# Patient Record
Sex: FEMALE | Race: WHITE | NOT HISPANIC OR LATINO | Employment: STUDENT | URBAN - NONMETROPOLITAN AREA
[De-identification: names, ages, dates, MRNs, and addresses within clinical notes are randomized per-mention and may not be internally consistent; named-entity substitution may affect disease eponyms.]

---

## 2021-04-23 ENCOUNTER — OFFICE VISIT (OUTPATIENT)
Dept: OBSTETRICS AND GYNECOLOGY | Facility: CLINIC | Age: 21
End: 2021-04-23

## 2021-04-23 VITALS
WEIGHT: 293 LBS | BODY MASS INDEX: 53.92 KG/M2 | HEIGHT: 62 IN | DIASTOLIC BLOOD PRESSURE: 74 MMHG | SYSTOLIC BLOOD PRESSURE: 110 MMHG

## 2021-04-23 DIAGNOSIS — N92.0 MENORRHAGIA WITH REGULAR CYCLE: Primary | ICD-10-CM

## 2021-04-23 DIAGNOSIS — N94.6 DYSMENORRHEA: ICD-10-CM

## 2021-04-23 DIAGNOSIS — Z30.011 ENCOUNTER FOR INITIAL PRESCRIPTION OF CONTRACEPTIVE PILLS: ICD-10-CM

## 2021-04-23 PROCEDURE — 99203 OFFICE O/P NEW LOW 30 MIN: CPT | Performed by: MIDWIFE

## 2021-04-23 RX ORDER — SERTRALINE HYDROCHLORIDE 100 MG/1
100 TABLET, FILM COATED ORAL DAILY
COMMUNITY

## 2021-04-23 RX ORDER — ARIPIPRAZOLE 5 MG/1
5 TABLET ORAL DAILY
COMMUNITY
Start: 2021-04-15

## 2021-04-23 RX ORDER — LEVONORGESTREL AND ETHINYL ESTRADIOL 0.1-0.02MG
1 KIT ORAL DAILY
Qty: 28 TABLET | Refills: 12 | Status: SHIPPED | OUTPATIENT
Start: 2021-04-23 | End: 2022-04-23

## 2021-04-23 NOTE — PROGRESS NOTES
"Subjective   Chief Complaint   Patient presents with   • Menorrhagia     (  Patient is a Virgin )pt states her menses are very heavy and painful, has been going on since she started menses @ age 9      Danuta Gutierrez is a 21 y.o. year old  presenting to be seen for heavy painful periods.  Menarche at age 9.  Periods have been monthly and the last for 5 days.  She states they are heavy the first 1 to 3 days with moderate to severe cramping.  She takes ibuprofen for pain.  She has never been sexually active.  She would like to try birth control pills to help regulate her menses.    History  Past Medical History:   Diagnosis Date   • Anemia    • Anxiety    • Depression    , Allergies:  Patient has no known allergies.        Review of Systems  Pertinent items are noted in HPI, all other systems reviewed and negative       Objective   /74   Ht 157.5 cm (62\")   Wt (!) 141 kg (311 lb)   LMP 03/15/2021 (Approximate)   BMI 56.88 kg/m²     Physical Exam:  General Appearance: alert, appears stated age and cooperative  Lungs: respirations regular, respirations even and respirations unlabored  Extremities: moves extremities well, no edema, no cyanosis and no redness  Skin: no bleeding, bruising or rash and no lesions noted  Neurologic: Mental Status orientated to person, place, time and situation, Speech normal content and execusion    Lab Review   No data reviewed    Imaging   No data reviewed         Assessment /Plan    Diagnoses and all orders for this visit:    1. Menorrhagia with regular cycle (Primary)    2. Encounter for initial prescription of contraceptive pills  She was instructed how to start her birth control.  It should be started on  following the onset of her next cycle.  The patient was educated regarding the correct and consistent use.  Because it may take 1 month to become effective, the use of alternative contraception for one month was stressed.  The potential for breakthrough bleeding " for up to 4 cycles was also emphasized.  In addition, the patient was counseled regarding hormonal contraception not providing protecting against sexually transmitted infections and the need for safe sex practices.  3. Dysmenorrhea    Other orders  -     levonorgestrel-ethinyl estradiol (AVIANE,ALESSE,LESSINA) 0.1-20 MG-MCG per tablet; Take 1 tablet by mouth Daily.  Dispense: 28 tablet; Refill: 12        New Medications Ordered This Visit   Medications   • levonorgestrel-ethinyl estradiol (AVIANE,ALESSE,LESSINA) 0.1-20 MG-MCG per tablet     Sig: Take 1 tablet by mouth Daily.     Dispense:  28 tablet     Refill:  12     Follow up 4 weeks            This note was electronically signed.  Shruti Noe CNM  4/23/2021

## 2021-05-21 ENCOUNTER — OFFICE VISIT (OUTPATIENT)
Dept: OBSTETRICS AND GYNECOLOGY | Facility: CLINIC | Age: 21
End: 2021-05-21

## 2021-05-21 VITALS
SYSTOLIC BLOOD PRESSURE: 118 MMHG | BODY MASS INDEX: 53.92 KG/M2 | DIASTOLIC BLOOD PRESSURE: 76 MMHG | WEIGHT: 293 LBS | HEIGHT: 62 IN

## 2021-05-21 DIAGNOSIS — Z30.41 ENCOUNTER FOR SURVEILLANCE OF CONTRACEPTIVE PILLS: Primary | ICD-10-CM

## 2021-05-21 PROCEDURE — 99212 OFFICE O/P EST SF 10 MIN: CPT | Performed by: MIDWIFE

## 2021-05-21 NOTE — PROGRESS NOTES
"Subjective  Chief Complaint   Patient presents with   • Follow-up     BC follow up, c/o moodiness.     Danuta Gutierrez is a 21 y.o. year old  here for F/U on new start OCs.  She is near the end of her first pack of pills. She has had a little bit of spotting.  She has noticed an increase in moodiness and some depression.  She is on Zoloft and Abilify.    History  Past Medical History:   Diagnosis Date   • Anemia    • Anxiety    • Depression      Current Outpatient Medications on File Prior to Visit   Medication Sig Dispense Refill   • ARIPiprazole (ABILIFY) 5 MG tablet Take 5 mg by mouth Daily.     • levonorgestrel-ethinyl estradiol (AVIANE,ALESSE,LESSINA) 0.1-20 MG-MCG per tablet Take 1 tablet by mouth Daily. 28 tablet 12   • sertraline (ZOLOFT) 100 MG tablet Take 100 mg by mouth Daily.     • sertraline (ZOLOFT) 50 MG tablet Take 50 mg by mouth Daily.       No current facility-administered medications on file prior to visit.       Review of Systems  Pertinent items are noted in HPI, all other systems reviewed and negative    Objective  Vitals:    21 1434   BP: 118/76   Weight: (!) 141 kg (310 lb)   Height: 157.5 cm (62\")     Physical Exam:  General Appearance: alert, appears stated age and cooperative  Lungs: respirations regular, respirations even and respirations unlabored  Extremities: moves extremities well, no edema, no cyanosis and no redness  Skin: no bleeding, bruising or rash and no lesions noted  Neurologic: Mental Status orientated to person, place, time and situation, Speech normal content and execusion    Lab Review   No data reviewed    Imaging   No data reviewed    Assessment/Plan    Problem List Items Addressed This Visit     Encounter for surveillance of birth control pills        No orders of the defined types were placed in this encounter.  Advised to continue OCs and see how she feels during 4th pack. Also advised to see if moodiness is more prevalent any certain week of OCs  Follow up " 1 year for annual exam    This note was electronically signed.  Shruti Noe, APRN  May 21, 2021

## 2023-08-30 ENCOUNTER — APPOINTMENT (OUTPATIENT)
Dept: CT IMAGING | Facility: HOSPITAL | Age: 23
End: 2023-08-30
Payer: COMMERCIAL

## 2023-08-30 ENCOUNTER — APPOINTMENT (OUTPATIENT)
Dept: MRI IMAGING | Facility: HOSPITAL | Age: 23
End: 2023-08-30
Payer: COMMERCIAL

## 2023-08-30 ENCOUNTER — APPOINTMENT (OUTPATIENT)
Dept: SLEEP MEDICINE | Facility: HOSPITAL | Age: 23
End: 2023-08-30
Payer: COMMERCIAL

## 2023-08-30 ENCOUNTER — HOSPITAL ENCOUNTER (EMERGENCY)
Facility: HOSPITAL | Age: 23
Discharge: HOME | End: 2023-08-30
Attending: EMERGENCY MEDICINE
Payer: COMMERCIAL

## 2023-08-30 VITALS
DIASTOLIC BLOOD PRESSURE: 65 MMHG | HEART RATE: 72 BPM | BODY MASS INDEX: 53.92 KG/M2 | HEIGHT: 62 IN | OXYGEN SATURATION: 96 % | WEIGHT: 293 LBS | RESPIRATION RATE: 18 BRPM | SYSTOLIC BLOOD PRESSURE: 102 MMHG | TEMPERATURE: 98.7 F

## 2023-08-30 DIAGNOSIS — R56.9 SEIZURE-LIKE ACTIVITY: Primary | ICD-10-CM

## 2023-08-30 LAB
ALBUMIN SERPL-MCNC: 3.9 G/DL (ref 3.5–5.2)
ALBUMIN/GLOB SERPL: 1.4 G/DL
ALP SERPL-CCNC: 114 U/L (ref 39–117)
ALT SERPL W P-5'-P-CCNC: 27 U/L (ref 1–33)
AMPHET+METHAMPHET UR QL: NEGATIVE
AMPHETAMINES UR QL: NEGATIVE
ANION GAP SERPL CALCULATED.3IONS-SCNC: 11.5 MMOL/L (ref 5–15)
AST SERPL-CCNC: 21 U/L (ref 1–32)
B-HCG UR QL: NEGATIVE
BARBITURATES UR QL SCN: NEGATIVE
BASOPHILS # BLD AUTO: 0.03 10*3/MM3 (ref 0–0.2)
BASOPHILS NFR BLD AUTO: 0.3 % (ref 0–1.5)
BENZODIAZ UR QL SCN: NEGATIVE
BILIRUB SERPL-MCNC: 0.2 MG/DL (ref 0–1.2)
BUN SERPL-MCNC: 8 MG/DL (ref 6–20)
BUN/CREAT SERPL: 11.6 (ref 7–25)
BUPRENORPHINE SERPL-MCNC: NEGATIVE NG/ML
CALCIUM SPEC-SCNC: 8.8 MG/DL (ref 8.6–10.5)
CANNABINOIDS SERPL QL: NEGATIVE
CHLORIDE SERPL-SCNC: 103 MMOL/L (ref 98–107)
CO2 SERPL-SCNC: 24.5 MMOL/L (ref 22–29)
COCAINE UR QL: NEGATIVE
CREAT SERPL-MCNC: 0.69 MG/DL (ref 0.57–1)
DEPRECATED RDW RBC AUTO: 39.1 FL (ref 37–54)
EGFRCR SERPLBLD CKD-EPI 2021: 124.5 ML/MIN/1.73
EOSINOPHIL # BLD AUTO: 0.1 10*3/MM3 (ref 0–0.4)
EOSINOPHIL NFR BLD AUTO: 1.1 % (ref 0.3–6.2)
ERYTHROCYTE [DISTWIDTH] IN BLOOD BY AUTOMATED COUNT: 13.3 % (ref 12.3–15.4)
GLOBULIN UR ELPH-MCNC: 2.7 GM/DL
GLUCOSE SERPL-MCNC: 88 MG/DL (ref 65–99)
HCT VFR BLD AUTO: 38.7 % (ref 34–46.6)
HGB BLD-MCNC: 12.6 G/DL (ref 12–15.9)
HOLD SPECIMEN: NORMAL
HOLD SPECIMEN: NORMAL
IMM GRANULOCYTES # BLD AUTO: 0.06 10*3/MM3 (ref 0–0.05)
IMM GRANULOCYTES NFR BLD AUTO: 0.6 % (ref 0–0.5)
LYMPHOCYTES # BLD AUTO: 1.95 10*3/MM3 (ref 0.7–3.1)
LYMPHOCYTES NFR BLD AUTO: 21.1 % (ref 19.6–45.3)
MAGNESIUM SERPL-MCNC: 2.1 MG/DL (ref 1.6–2.6)
MCH RBC QN AUTO: 26.3 PG (ref 26.6–33)
MCHC RBC AUTO-ENTMCNC: 32.6 G/DL (ref 31.5–35.7)
MCV RBC AUTO: 80.6 FL (ref 79–97)
METHADONE UR QL SCN: NEGATIVE
MONOCYTES # BLD AUTO: 0.73 10*3/MM3 (ref 0.1–0.9)
MONOCYTES NFR BLD AUTO: 7.9 % (ref 5–12)
NEUTROPHILS NFR BLD AUTO: 6.39 10*3/MM3 (ref 1.7–7)
NEUTROPHILS NFR BLD AUTO: 69 % (ref 42.7–76)
NRBC BLD AUTO-RTO: 0 /100 WBC (ref 0–0.2)
OPIATES UR QL: NEGATIVE
OXYCODONE UR QL SCN: NEGATIVE
PCP UR QL SCN: NEGATIVE
PHOSPHATE SERPL-MCNC: 2.1 MG/DL (ref 2.5–4.5)
PLATELET # BLD AUTO: 330 10*3/MM3 (ref 140–450)
PMV BLD AUTO: 9.2 FL (ref 6–12)
POTASSIUM SERPL-SCNC: 3.4 MMOL/L (ref 3.5–5.2)
PROPOXYPH UR QL: NEGATIVE
PROT SERPL-MCNC: 6.6 G/DL (ref 6–8.5)
RBC # BLD AUTO: 4.8 10*6/MM3 (ref 3.77–5.28)
SODIUM SERPL-SCNC: 139 MMOL/L (ref 136–145)
TRICYCLICS UR QL SCN: NEGATIVE
WBC NRBC COR # BLD: 9.26 10*3/MM3 (ref 3.4–10.8)
WHOLE BLOOD HOLD COAG: NORMAL
WHOLE BLOOD HOLD SPECIMEN: NORMAL

## 2023-08-30 PROCEDURE — 83735 ASSAY OF MAGNESIUM: CPT | Performed by: EMERGENCY MEDICINE

## 2023-08-30 PROCEDURE — 80053 COMPREHEN METABOLIC PANEL: CPT | Performed by: EMERGENCY MEDICINE

## 2023-08-30 PROCEDURE — 85025 COMPLETE CBC W/AUTO DIFF WBC: CPT | Performed by: EMERGENCY MEDICINE

## 2023-08-30 PROCEDURE — 93005 ELECTROCARDIOGRAM TRACING: CPT

## 2023-08-30 PROCEDURE — 95816 EEG AWAKE AND DROWSY: CPT

## 2023-08-30 PROCEDURE — 95816 EEG AWAKE AND DROWSY: CPT | Performed by: PSYCHIATRY & NEUROLOGY

## 2023-08-30 PROCEDURE — 93005 ELECTROCARDIOGRAM TRACING: CPT | Performed by: EMERGENCY MEDICINE

## 2023-08-30 PROCEDURE — 25010000002 LORAZEPAM PER 2 MG: Performed by: EMERGENCY MEDICINE

## 2023-08-30 PROCEDURE — 25510000001 IOPAMIDOL 61 % SOLUTION: Performed by: EMERGENCY MEDICINE

## 2023-08-30 PROCEDURE — 99285 EMERGENCY DEPT VISIT HI MDM: CPT

## 2023-08-30 PROCEDURE — 82948 REAGENT STRIP/BLOOD GLUCOSE: CPT

## 2023-08-30 PROCEDURE — 70496 CT ANGIOGRAPHY HEAD: CPT

## 2023-08-30 PROCEDURE — 0 GADOBENATE DIMEGLUMINE 529 MG/ML SOLUTION: Performed by: EMERGENCY MEDICINE

## 2023-08-30 PROCEDURE — 81025 URINE PREGNANCY TEST: CPT | Performed by: EMERGENCY MEDICINE

## 2023-08-30 PROCEDURE — 80306 DRUG TEST PRSMV INSTRMNT: CPT | Performed by: EMERGENCY MEDICINE

## 2023-08-30 PROCEDURE — A9577 INJ MULTIHANCE: HCPCS | Performed by: EMERGENCY MEDICINE

## 2023-08-30 PROCEDURE — 70450 CT HEAD/BRAIN W/O DYE: CPT

## 2023-08-30 PROCEDURE — 70553 MRI BRAIN STEM W/O & W/DYE: CPT

## 2023-08-30 PROCEDURE — 99204 OFFICE O/P NEW MOD 45 MIN: CPT | Performed by: STUDENT IN AN ORGANIZED HEALTH CARE EDUCATION/TRAINING PROGRAM

## 2023-08-30 PROCEDURE — 96374 THER/PROPH/DIAG INJ IV PUSH: CPT

## 2023-08-30 PROCEDURE — 84100 ASSAY OF PHOSPHORUS: CPT | Performed by: EMERGENCY MEDICINE

## 2023-08-30 RX ORDER — DIPHENHYDRAMINE HYDROCHLORIDE 50 MG/ML
25 INJECTION INTRAMUSCULAR; INTRAVENOUS ONCE
Status: DISCONTINUED | OUTPATIENT
Start: 2023-08-30 | End: 2023-08-30 | Stop reason: HOSPADM

## 2023-08-30 RX ORDER — LORAZEPAM 2 MG/ML
1 INJECTION INTRAMUSCULAR ONCE
Status: COMPLETED | OUTPATIENT
Start: 2023-08-30 | End: 2023-08-30

## 2023-08-30 RX ORDER — TOPIRAMATE 25 MG/1
25 TABLET ORAL ONCE
Status: COMPLETED | OUTPATIENT
Start: 2023-08-30 | End: 2023-08-30

## 2023-08-30 RX ORDER — PROCHLORPERAZINE EDISYLATE 5 MG/ML
10 INJECTION INTRAMUSCULAR; INTRAVENOUS ONCE
Status: DISCONTINUED | OUTPATIENT
Start: 2023-08-30 | End: 2023-08-30 | Stop reason: HOSPADM

## 2023-08-30 RX ORDER — TOPIRAMATE 25 MG/1
25 TABLET ORAL 2 TIMES DAILY
Qty: 120 TABLET | Refills: 0 | Status: SHIPPED | OUTPATIENT
Start: 2023-08-30

## 2023-08-30 RX ORDER — ACETAMINOPHEN 500 MG
1000 TABLET ORAL ONCE
Status: COMPLETED | OUTPATIENT
Start: 2023-08-30 | End: 2023-08-30

## 2023-08-30 RX ORDER — SODIUM CHLORIDE 0.9 % (FLUSH) 0.9 %
10 SYRINGE (ML) INJECTION AS NEEDED
Status: DISCONTINUED | OUTPATIENT
Start: 2023-08-30 | End: 2023-08-30 | Stop reason: HOSPADM

## 2023-08-30 RX ADMIN — DIBASIC SODIUM PHOSPHATE, MONOBASIC POTASSIUM PHOSPHATE AND MONOBASIC SODIUM PHOSPHATE 2 TABLET: 852; 155; 130 TABLET ORAL at 13:14

## 2023-08-30 RX ADMIN — LORAZEPAM 1 MG: 2 INJECTION INTRAMUSCULAR; INTRAVENOUS at 07:39

## 2023-08-30 RX ADMIN — IOPAMIDOL 100 ML: 612 INJECTION, SOLUTION INTRAVENOUS at 16:03

## 2023-08-30 RX ADMIN — GADOBENATE DIMEGLUMINE 29 ML: 529 INJECTION, SOLUTION INTRAVENOUS at 13:07

## 2023-08-30 RX ADMIN — TOPIRAMATE 25 MG: 25 TABLET, FILM COATED ORAL at 19:15

## 2023-08-30 RX ADMIN — SODIUM CHLORIDE 1000 ML: 9 INJECTION, SOLUTION INTRAVENOUS at 07:39

## 2023-08-30 RX ADMIN — ACETAMINOPHEN 1000 MG: 500 TABLET, FILM COATED ORAL at 11:01

## 2023-08-30 NOTE — ED PROVIDER NOTES
HPI: Danuta Gutierrez is a 23 y.o. female who presents to the emergency department complaining of seizure.  Patient states she does not really remember what happened this morning.  Her parents state they heard her talking in her sleep which was not really unusual for her but then something sounded different so they went to check on her.  They found her convulsing.  This lasted maybe 5 to 10 minutes.  It took her then nearly an hour to get back to normal.  At this time she feels weak.  She also notes some numbness to her upper lip.  She denies any fevers, chills, nausea, vomiting, chest pain, shortness of breath.  She did bite her tongue during the episode.  No personal or family history of seizure.      REVIEW OF SYSTEMS: All other systems reviewed and are negative     PAST MEDICAL HISTORY:   Past Medical History:   Diagnosis Date    Anemia     Anxiety     Depression         FAMILY HISTORY:   Family History   Problem Relation Age of Onset    Hyperlipidemia Father     Diabetes Paternal Grandfather     Breast cancer Paternal Grandmother     Diabetes Paternal Grandmother         SOCIAL HISTORY:   Social History     Socioeconomic History    Marital status: Single    Number of children: 0   Tobacco Use    Smoking status: Never    Smokeless tobacco: Never   Vaping Use    Vaping Use: Never used   Substance and Sexual Activity    Alcohol use: Yes     Comment: Once a month     Drug use: Never    Sexual activity: Never     Partners: Male     Birth control/protection: None        SURGICAL HISTORY:   Past Surgical History:   Procedure Laterality Date    NO PAST SURGERIES          ALLERGIES: Patient has no known allergies.       PHYSICAL EXAM:   VITAL SIGNS:   Vitals:    08/30/23 1900   BP: 102/65   Pulse: 72   Resp: 18   Temp:    SpO2: 96%      CONSTITUTIONAL: Awake, well appearing, nontoxic   HENT: Atraumatic, normocephalic, oral mucosa moist, airway patent.  Small bite wound to the tip of the tongue nares patent without  drainage. External ears normal.   EYES: Conjunctivae clear, EOMI, PERRL   NECK: Trachea midline, nontender, supple   CARDIOVASCULAR: Mild tachycardia, Normal rhythm.  PULMONARY/CHEST: Normal work of breathing. Clear to auscultation, no rhonchi, wheezes, or rales.  ABDOMINAL: Nondistended, soft, nontender, no rebound or guarding.  NEUROLOGIC: Nonfocal, moves all four extremities, no gross sensory or motor deficits.   EXTREMITIES: No clubbing, cyanosis, or edema   SKIN: Warm, Dry, No erythema, No rash       ED COURSE / MEDICAL DECISION MAKING:     Danuta Gutierrez is a 23 y.o. female who presents to the emergency department for evaluation of seizure.  Well-developed, well-nourished obese young lady in no distress with exam as above.  Her vital signs are normal save for a mild tachycardia.  Her exam is otherwise relatively nonfocal.  Will obtain labs, head CT and give symptomatic treatment.  Disposition pending.    Differential diagnosis includes seizure, electrolyte disturbance, anxiety, malignancy among other etiologies.    EKG interpreted by me: Sinus tachycardia, no acute ST/T changes, this is an abnormal EKG    1120  Labs are nonactionable.  CT of the head is negative.  Patient is resting comfortably.  I discussed the case with Dr. Godinez, he recommended obtaining additional labs, MRI, EEG.  He will then see patient here in the ED.  Disposition pending.    1450  MRI is negative.  EEG has not been performed yet.  Patient has been evaluated by Dr. Godinez, he recommended treatment of headache, CT venogram.  Disposition pending.    1905  Discussed again with Dr. Godinez, he unfortunately cannot get access to the EEG to read it.  CTV is negative.  He recommended discharging patient home with Topamax.  Patient has remained stable throughout her extended stay here in the ED without seizure-like activity.  Patient and family are happy with this plan.    Final diagnoses:   Seizure-like activity        Seun Galarza  MD [place]  08/30/23 7111

## 2023-08-30 NOTE — Clinical Note
[facility] EMERGENCY DEPARTMENT  [address] [place] [address]  Phone: [phone]    Danuta Gutierrez was seen and treated in our emergency department on 8/30/2023.  She may return to work on 09/02/2023.         Thank you for choosing [facility].    Seun Galarza MD

## 2023-08-30 NOTE — CONSULTS
[facility]   Teleneurology Note    Patient Name: Danuta Gutierrez  : 2000  MRN: [id number]  Primary Care Physician: Khadijah Zhou MD  Referring Site: [place]  Location of Neurologist: [facility]    Subjective   Teleneurology Initial Data     Arrival Date Telestroke Site: 23 Arrival Time Telestroke Site: 0651   Neurologist Evaluation Date: 23 Neurologist Evaluation Time: 1442   Date Last Known Well: 23 Time Last Known Well: 043     History     Chief Complaint: seizures  HPI: 23 year female with no medical history, who is morbidly obese, has a history of anxiety who was found to be morning while asleep at around 4:30 AM today.  Family noticed that patient was having generalized tonic-clonic movements, and had a tongue bite laterally.  She was confused post ictal confused for 45 minutes.  She currently experiences headache holocranial.  She has a history of migraines, on anxiety on the left, on birth control pills.   Denies smoking denies any other medical chronic conditions.       Stroke Risk Factors/ Pertinent Data     Stroke risk factors: none  Anticoagulants prior to arrival: not applicable  Antiplatelets prior to arrival: not applicable  Statins prior to arrival: not applicable     Scoring Scales     Pre-Stroke Modified Randolph Scale: 0 - No Symptoms at all.[place] Coma Scale  Best Eye Response: Spontaneous  Best Verbal Response: Oriented  Best Motor Response: Follows commands  [place] Coma Scale Score: 15Intracerebral Hemmorhage (ICH) Score  Zhane Coma Score: 13-15  Age>=80: no     NIH Stroke Scale     NIHSS Performed Date: 23 NIHSS Performed Time: 1443   Interval: baseline  1a. Level of Consciousness: 0-->Alert, keenly responsive  1b. LOC Questions: 0-->Answers both questions correctly  1c. LOC Commands: 0-->Performs both tasks correctly  2. Best Gaze: 0-->Normal  3. Visual: 0-->No visual loss  4. Facial Palsy: 0-->Normal symmetrical movements  5a. Motor Arm, Left: 0-->No  drift, limb holds 90 (or 45) degrees for full 10 secs  5b. Motor Arm, Right: 0-->No drift, limb holds 90 (or 45) degrees for full 10 secs  6a. Motor Leg, Left: 0-->No drift, leg holds 30 degree position for full 5 secs  6b. Motor Leg, Right: 0-->No drift, leg holds 30 degree position for full 5 secs  7. Limb Ataxia: 0-->Absent  8. Sensory: 0-->Normal, no sensory loss  9. Best Language: 0-->No aphasia, normal  10. Dysarthria: 0-->Normal  11. Extinction and Inattention (formerly Neglect): 0-->No abnormality  Total (NIH Stroke Scale): 0     Review of Systems     Review of Systems  Constitutional: No fatigue  HENT: Negative for nosebleeds and rhinorrhea.    Eyes: Negative for redness.   Respiratory: Negative for cough.    Gastrointestinal: Negative for anal bleeding.   Endocrine: Negative for polydipsia.   Genitourinary: Negative for enuresis and urgency.   Musculoskeletal: Negative for joint swelling.   Neurological: Negative for tremors.   Psychiatric/Behavioral: Negative for hallucinations.    Objective   Exam     Exam performed with the help of support staff from the referring site  Neurological Exam  NEURO( Exam is performed with help of hospital staff at bed side and observed by me via audio-video interface)    MENTAL STATUS: AAOx3, memory intact, fund of knowledge appropriate    LANG/SPEECH: Naming and repetition intact, fluent, follows 3-step commands    CRANIAL NERVES:    Pupils equal and reactive, EOMI intact, no gaze deviation, no nystagmus  No facial droop, cough and gag intact, shoulder shrug intact, tongue midline     MOTOR:  Moves all extremities equally    SENSORY: Normal to light touch all throughout     COORDINATION: Normal finger to nose and heel to shin, no tremor, no dysmetria    STATION: Not assessed due to patient condition    GAIT: Not assessed due to patient condition   Result Review    Results          Personal review of CNS imaging:(Official report by radiologist pending)  Imaging  CT Imaging  Review: CT Imaging reviewed, NO acute infarct/ hemorrhage seen  CTA Imaging Review: CTA Imaging reviewed, NO large vessel occlusion or severe stenosis seen  CT Perfusion Review: CT perfusion reviewed, NORMAL  ASPECTS Involved Locations: none  ASPECTS Score: 10    Thrombolytic   Thrombolytics: thrombolytic not given     Assessment & Plan   Assessment/ Plan     Assessment:  This 23-year-old female who had likely a new onset of seizure.  Etiology unknown.  Need to rule out on the provoking factors with her underlying hypoxia and underlying undiagnosed sleep apnea complexed with anxiety.  I will not start any antiseizure medications at this time.  MRI brain with and without contrast showed no acute abnormality.  Recommend CTV to rule out any venous thrombosis, especially with her age with being morbidly obese and on birth control pills.  Symptomatic treatment of her headache.  Recommend Topamax 25 mg twice daily for 1 week, if she tolerates without any side effects escalate 50 mg twice daily for another week, 74 twice daily for another week, stabilize the maintenance dose of 100 mg twice daily.  Patient was counseled regarding the risk of birth defects due to Topamax use if at all if she were to become pregnant.  I am okay for her to be discharged if her EEG is negative, if head CTV is negative.       Please call neurology for any further questions or clarifications    Disposition     Disposition: The patient will remain at the referring institution for further evaluation and management    Medical Decision Making  Medical Data Reviewed: Data reviewed including: clinical labs, radiology and/or medical tests, Independent review of CNS images    Claudio RODRIGUEZ MD, saw the patient on 08/30/23 at 1442 for an initial in-patient or emergency room telememedicine face to face consult using interactive technology for  . The location of the patient was [place]. I was located at [place].    I have proceeded with this  evaluation at the request of the referring practitioner as it is felt to be an emergency setting and no appropriate specialist is available to perform this evaluation. The [facility] has reported that this is the correct patient and has obtained consent from the patient/surrogate to perform this telemedicine evaluation(including obtaining history, performing examination and reviewing data provided by the patient an/or originating site of care provider)    I have introduced myself to the patient, provided my credentials, disclosed my location, and determined that, based on review of the patient's chart and discussion with the patient's primary team, telemedicine via a HIPAA compliant, real-time, face-to-face two-way, interactive audio and video platform is an appropriate and effective means of providing the service.    The patient/surrogate has a right to refuse this evaluation as they have been explained risks including potential loss of confidentiality, benefits, alternatives, and the potential need for subsequent face-to-face care. In this evaluation, we will be providing recommendations only.  The ultimate decision to follow or not to follow these recommendations will be left to the bedside treating/requesting practitioner.    The patient/surrogate has been notified that other healthcare professionals including technical person may be involved in this A/V evaluation.  All laws concerning confidentiality and patient access to medical records and copies of medical records apply to telemedicine.  The patient/surrogate has received the originating [facility]'s Health Notice of Privacy Practices.    Claudio Godinez MD

## 2023-08-31 ENCOUNTER — TELEPHONE (OUTPATIENT)
Dept: NEUROLOGY | Facility: CLINIC | Age: 23
End: 2023-08-31

## 2023-08-31 ENCOUNTER — TELEPHONE (OUTPATIENT)
Dept: NEUROLOGY | Facility: CLINIC | Age: 23
End: 2023-08-31
Payer: COMMERCIAL

## 2023-08-31 NOTE — TELEPHONE ENCOUNTER
Caller: NASIM     Rashid call back number: [phone]    New or established patient?  [x] New  [] Established    Date of discharge: 08.30.23    Facility discharged from: [facility]     Diagnosis/Symptoms: SEIZURE     Length of stay (If applicable): 12 HOURS     Specialty Only: Did you see a [facility] provider?    [x] Yes  [] No  If so, who? CONSULT WITH YOHANNES DILLARD NEEDS F.U WITH NEURO . WILL TAKE FIRST AVAIL.     PLEASE ADVISE.

## 2023-08-31 NOTE — TELEPHONE ENCOUNTER
LVM.  Our office does not see patients for seizures and that is what she was in the ED for.  Patient should be seen by General Neurology.

## 2023-09-07 LAB — GLUCOSE BLDC GLUCOMTR-MCNC: 92 MG/DL (ref 70–130)

## 2023-10-02 ENCOUNTER — OFFICE VISIT (OUTPATIENT)
Dept: NEUROLOGY | Facility: CLINIC | Age: 23
End: 2023-10-02
Payer: COMMERCIAL

## 2023-10-02 VITALS
HEIGHT: 62 IN | DIASTOLIC BLOOD PRESSURE: 70 MMHG | TEMPERATURE: 97.8 F | HEART RATE: 75 BPM | WEIGHT: 293 LBS | OXYGEN SATURATION: 99 % | BODY MASS INDEX: 53.92 KG/M2 | SYSTOLIC BLOOD PRESSURE: 110 MMHG

## 2023-10-02 DIAGNOSIS — R06.83 SNORING: ICD-10-CM

## 2023-10-02 DIAGNOSIS — R56.9 SEIZURE-LIKE ACTIVITY: Primary | ICD-10-CM

## 2023-10-02 DIAGNOSIS — G47.50 PARASOMNIA, UNSPECIFIED TYPE: ICD-10-CM

## 2023-10-02 DIAGNOSIS — G47.19 EXCESSIVE DAYTIME SLEEPINESS: ICD-10-CM

## 2023-10-02 DIAGNOSIS — G47.33 OSA (OBSTRUCTIVE SLEEP APNEA): ICD-10-CM

## 2023-10-02 PROCEDURE — 99214 OFFICE O/P EST MOD 30 MIN: CPT | Performed by: NURSE PRACTITIONER

## 2023-10-02 RX ORDER — TOPIRAMATE 100 MG/1
100 TABLET, FILM COATED ORAL 2 TIMES DAILY
Qty: 60 TABLET | Refills: 5 | Status: SHIPPED | OUTPATIENT
Start: 2023-10-02

## 2023-10-02 NOTE — PROGRESS NOTES
"     New Patient Office Visit      Patient Name: Danuta Gutierrez  : 2000   MRN: [id number]     Referring Physician: No ref. provider found    Chief Complaint:    Chief Complaint   Patient presents with    Seizures     Hospital follow up; Last SZ 2023; Normal EEG        History of Present Illness: Danuta Gutierrez is a 23 y.o. female who is here today to establish care with Neurology for witnessed seizure-like activity.  She is accompanied by her mother, Sophia, today.  She is taking Topiramate 100mg BID- reports good compliance and toleration.  She had an episode of witnessed seizure-like activity on 2023, while sleeping at home.  Episode witnessed by mother and father.  Patient states, \"I have had this happen before, where my lip tingles and it's like my jaw locks up and I just severiano stop breathing but I try to get up and can't move\".  Mother says they heard the patient making noises in her sleep and her father went to check on her and the patient was laying in the bed and \"Her whole body was shaking and he was trying to wake her up but she wasn't responding, I turned her over on her side and I tried to get her mouth open but it was clenched together and I couldn't get her mouth open\".  Mother says the patient would open her eyes \"But it's like nobody was in there\".  Parents called 911.  Patient says she awakened and her mother was in her room yelling at her to get up because EMS was there.  She was confused after the episode.  She bit her tongue.  No urine incontinence.  She did not feel normal that entire day and was very sore that day and states, \"I was stiff and sore and could barely walk\".  She was under an increased amount of stress at the time of the most recent event.  Has had similar episodes for the past year but feels they are worse now.  Denies alcohol or illegal drug use.  Says she has been diagnosed with significant HUONG and is not on treatment.  Graduated from college.  She was born at 36 " weeks, weighed 7 pounds 9 ounces.  Denies any history of head injury.  Additional risk factors- BMI 57, mood disorder.   *Sleep questionnaire reviewed- she snores loudly, experiences restless or disturbed sleep, has abnormal behavior during sleep- has gotten out of bed during sleep and walked around the house, talks during sleep- Mother has had full conversations with her and she doesn't remember those, grinds her teeth, awakens with a headache frequently, has excessive daytime sleepiness, awakens feeling tired.  [place] score 17.     Pertinent Medical History:  MRI brain with and without contrast on 8/30/2023 was noncontributory and EEG was normal.     Subjective      Review of Systems:   Review of Systems   Respiratory:  Positive for apnea.    Neurological:  Positive for seizures.   Psychiatric/Behavioral:  Positive for sleep disturbance.      Past Medical History:   Past Medical History:   Diagnosis Date    Anemia     Anxiety     Depression        Past Surgical History:   Past Surgical History:   Procedure Laterality Date    NO PAST SURGERIES         Family History:   Family History   Problem Relation Age of Onset    Hyperlipidemia Father     Diabetes Paternal Grandfather     Breast cancer Paternal Grandmother     Diabetes Paternal Grandmother        Social History:   Social History     Socioeconomic History    Marital status: Single    Number of children: 0   Tobacco Use    Smoking status: Never    Smokeless tobacco: Never   Vaping Use    Vaping Use: Never used   Substance and Sexual Activity    Alcohol use: Yes     Comment: Once a month     Drug use: Never    Sexual activity: Never     Partners: Male     Birth control/protection: None       Medications:     Current Outpatient Medications:     levonorgestrel-ethinyl estradiol (AVIANE,ALESSE,LESSINA) 0.1-20 MG-MCG per tablet, Take 1 tablet by mouth Daily., Disp: 28 tablet, Rfl: 12    sertraline (ZOLOFT) 100 MG tablet, Take 1 tablet by mouth Daily., Disp: , Rfl:  "    topiramate (Topamax) 100 MG tablet, Take 1 tablet by mouth 2 (Two) Times a Day., Disp: 60 tablet, Rfl: 5    Allergies:   No Known Allergies    Objective     Physical Exam:  Vital Signs:   Vitals:    10/02/23 1105   BP: 110/70   Pulse: 75   Temp: 97.8 °F (36.6 °C)   SpO2: 99%   Weight: (!) 143 kg (315 lb)   Height: 157.5 cm (62\")   PainSc: 0-No pain     Body mass index is 57.61 kg/m².     Neck circumference- 13 inches    Physical Exam  Vitals and nursing note reviewed.   Constitutional:       General: She is not in acute distress.     Appearance: Normal appearance. She is well-developed. She is obese. She is not diaphoretic.   HENT:      Head: Normocephalic and atraumatic.      Mouth/Throat:      Comments: Mallampati 4 with thick tongue  Eyes:      Extraocular Movements: Extraocular movements intact.      Conjunctiva/sclera: Conjunctivae normal.      Pupils: Pupils are equal, round, and reactive to light.   Pulmonary:      Effort: Pulmonary effort is normal. No respiratory distress.   Musculoskeletal:         General: Normal range of motion.   Skin:     General: Skin is warm and dry.   Neurological:      Mental Status: She is alert and oriented to person, place, and time.      Cranial Nerves: Cranial nerves 2-12 are intact.      Coordination: Finger-Nose-Finger Test normal.      Gait: Gait is intact.   Psychiatric:         Mood and Affect: Mood normal.         Behavior: Behavior normal.         Thought Content: Thought content normal.         Judgment: Judgment normal.       Neurologic Exam     Mental Status   Oriented to person, place, and time.   Level of consciousness: alert    Cranial Nerves   Cranial nerves II through XII intact.     CN II   Visual fields full to confrontation.     CN III, IV, VI   Pupils are equal, round, and reactive to light.  CN III: no CN III palsy  CN VI: no CN VI palsy  Nystagmus: none     CN V   Facial sensation intact.     CN VII   Facial expression full, symmetric.     CN IX, X "   CN IX normal.   CN X normal.     CN XI   CN XI normal.     CN XII   CN XII normal.     Motor Exam   Muscle bulk: normal  Overall muscle tone: normal    Strength   Right biceps: 5/5  Left biceps: 5/5  Right triceps: 5/5  Left triceps: 5/5  Right quadriceps: 5/5  Left quadriceps: 5/5  Right hamstrin/5  Left hamstrin/5    Sensory Exam   Light touch normal.   Proprioception normal.     Gait, Coordination, and Reflexes     Gait  Gait: normal    Coordination   Finger to nose coordination: normal    Tremor   Resting tremor: absent  Intention tremor: absent  Action tremor: absent    Assessment / Plan      Assessment/Plan:   Diagnoses and all orders for this visit:    1. Seizure-like activity (Primary)  -     topiramate (Topamax) 100 MG tablet; Take 1 tablet by mouth 2 (Two) Times a Day.  Dispense: 60 tablet; Refill: 5    2. Snoring  -     Polysomnography 4 or More Parameters; Future    3. HUONG (obstructive sleep apnea)  -     Polysomnography 4 or More Parameters; Future    4. Excessive daytime sleepiness  -     Polysomnography 4 or More Parameters; Future    5. Parasomnia, unspecified type  -     Polysomnography 4 or More Parameters; Future    6. BMI 50.0-59.9, adult  -     Polysomnography 4 or More Parameters; Future    *Patient education on HUONG, Seizures, Nonepileptic seizures provided today.   *Encouraged weight loss with a BMI goal of 24.   *I have advised patient to avoid driving if drowsy.    *Advised patient of [place] laws regarding seizures and driving- no driving for 90 days following a seizure.   *I have advised patient's Mother to call 911 for any seizure like activity lasting more than 5 minutes. No baths, showers only.   *May consider referral to Dr. Otto for seizure identification/EMU stay at follow up visit.      Follow Up:   Return in about 3 months (around 2024) for Follow Up.    CARMELO Palmer, FNP-C  [facility]

## 2023-10-16 ENCOUNTER — HOSPITAL ENCOUNTER (OUTPATIENT)
Dept: SLEEP MEDICINE | Facility: HOSPITAL | Age: 23
Discharge: HOME | End: 2023-10-16
Admitting: NURSE PRACTITIONER
Payer: COMMERCIAL

## 2023-10-16 DIAGNOSIS — G47.33 OSA (OBSTRUCTIVE SLEEP APNEA): ICD-10-CM

## 2023-10-16 DIAGNOSIS — R06.83 SNORING: ICD-10-CM

## 2023-10-16 DIAGNOSIS — G47.19 EXCESSIVE DAYTIME SLEEPINESS: ICD-10-CM

## 2023-10-16 DIAGNOSIS — G47.50 PARASOMNIA, UNSPECIFIED TYPE: ICD-10-CM

## 2023-10-16 PROCEDURE — 95810 POLYSOM 6/> YRS 4/> PARAM: CPT

## 2023-10-19 PROCEDURE — 95810 POLYSOM 6/> YRS 4/> PARAM: CPT | Performed by: INTERNAL MEDICINE

## 2023-12-11 ENCOUNTER — OFFICE VISIT (OUTPATIENT)
Dept: NEUROLOGY | Facility: CLINIC | Age: 23
End: 2023-12-11
Payer: COMMERCIAL

## 2023-12-11 VITALS
OXYGEN SATURATION: 99 % | TEMPERATURE: 96.9 F | SYSTOLIC BLOOD PRESSURE: 110 MMHG | DIASTOLIC BLOOD PRESSURE: 70 MMHG | HEIGHT: 62 IN | BODY MASS INDEX: 53.92 KG/M2 | HEART RATE: 71 BPM | WEIGHT: 293 LBS

## 2023-12-11 DIAGNOSIS — R56.9 SEIZURE-LIKE ACTIVITY: Primary | ICD-10-CM

## 2023-12-11 PROCEDURE — 99213 OFFICE O/P EST LOW 20 MIN: CPT | Performed by: NURSE PRACTITIONER

## 2023-12-11 NOTE — PROGRESS NOTES
"     Follow Up Office Visit      Patient Name: Danuta Gutierrez  : 2000   MRN: [id number]     Chief Complaint:    Chief Complaint   Patient presents with    Follow-up     Patient in office to follow up on seizures.        History of Present Illness: Danuta Gutierrez is a 23 y.o. female who is here today to follow up with seizure-like activity and was last seen on 10/2/2023.  She is accompanied by her mother, Sophia, again today.  She is taking Topiramate 100mg BID- reports good compliance; is having some tingling in her feet.  She has had no seizure-like activity since her previous visit.  She has had less headaches since starting Topiramate.  Her mother feels she is doing quite well right now.   *PSG on 10/16/2023 showed no evidence of significant HUONG with an AHI of 1/hour.      Following taken from previous visit note:  Danuta Gutierrez is a 23 y.o. female who is here today to establish care with Neurology for witnessed seizure-like activity.  She is accompanied by her mother, Sophia, today.  She is taking Topiramate 100mg BID- reports good compliance and toleration.  She had an episode of witnessed seizure-like activity on 2023, while sleeping at home.  Episode witnessed by mother and father.  Patient states, \"I have had this happen before, where my lip tingles and it's like my jaw locks up and I just severiano stop breathing but I try to get up and can't move\".  Mother says they heard the patient making noises in her sleep and her father went to check on her and the patient was laying in the bed and \"Her whole body was shaking and he was trying to wake her up but she wasn't responding, I turned her over on her side and I tried to get her mouth open but it was clenched together and I couldn't get her mouth open\".  Mother says the patient would open her eyes \"But it's like nobody was in there\".  Parents called 911.  Patient says she awakened and her mother was in her room yelling at her to get up because EMS was there.  " "She was confused after the episode.  She bit her tongue.  No urine incontinence.  She did not feel normal that entire day and was very sore that day and states, \"I was stiff and sore and could barely walk\".  She was under an increased amount of stress at the time of the most recent event.  Has had similar episodes for the past year but feels they are worse now.  Denies alcohol or illegal drug use.  Says she has been diagnosed with significant HUONG and is not on treatment.  Graduated from college.  She was born at 36 weeks, weighed 7 pounds 9 ounces.  Denies any history of head injury.  Additional risk factors- BMI 57, mood disorder.   *Sleep questionnaire reviewed- she snores loudly, experiences restless or disturbed sleep, has abnormal behavior during sleep- has gotten out of bed during sleep and walked around the house, talks during sleep- Mother has had full conversations with her and she doesn't remember those, grinds her teeth, awakens with a headache frequently, has excessive daytime sleepiness, awakens feeling tired.  [place] score 17.      Pertinent Medical History:  MRI brain with and without contrast on 8/30/2023 was noncontributory and EEG was normal.     Subjective      Review of Systems:   Review of Systems   Neurological:  Positive for seizures.       I have reviewed and the following portions of the patient's history were updated as appropriate: past family history, past medical history, past social history, past surgical history and problem list.    Medications:     Current Outpatient Medications:     sertraline (ZOLOFT) 100 MG tablet, Take 1 tablet by mouth Daily., Disp: , Rfl:     topiramate (Topamax) 100 MG tablet, Take 1 tablet by mouth 2 (Two) Times a Day., Disp: 60 tablet, Rfl: 5    levonorgestrel-ethinyl estradiol (AVIANE,ALESSE,LESSINA) 0.1-20 MG-MCG per tablet, Take 1 tablet by mouth Daily., Disp: 28 tablet, Rfl: 12    Allergies:   No Known Allergies    Objective     Physical Exam:  Vital " "Signs:   Vitals:    12/11/23 1333   BP: 110/70   BP Location: Right arm   Patient Position: Sitting   Cuff Size: Adult   Pulse: 71   Temp: 96.9 °F (36.1 °C)   SpO2: 99%   Weight: (!) 141 kg (311 lb 9.6 oz)   Height: 157.5 cm (62\")   PainSc: 0-No pain     Body mass index is 56.99 kg/m².    Physical Exam  Vitals and nursing note reviewed.   Constitutional:       General: She is not in acute distress.     Appearance: She is well-developed. She is obese. She is not diaphoretic.   HENT:      Head: Normocephalic and atraumatic.   Eyes:      Extraocular Movements: Extraocular movements intact.      Conjunctiva/sclera: Conjunctivae normal.   Pulmonary:      Effort: Pulmonary effort is normal. No respiratory distress.   Musculoskeletal:         General: Normal range of motion.   Skin:     General: Skin is warm and dry.   Neurological:      Mental Status: She is alert and oriented to person, place, and time.   Psychiatric:         Mood and Affect: Mood normal.         Behavior: Behavior normal.         Thought Content: Thought content normal.         Judgment: Judgment normal.         Neurologic Exam     Mental Status   Oriented to person, place, and time.        Assessment / Plan      Assessment/Plan:   Diagnoses and all orders for this visit:    1. Seizure-like activity (Primary)    2. BMI 50.0-59.9, adult    *Offered referral to Dr. Barlow, but patient is not agreeable to that today.   *Safety precautions discussed and encouraged.    *I have encouraged patient to take Topiramate 50mg BID and see if the tingling in the feet improves. I have advised patient to avoid pregnancy while taking this medication due to its teratogenicity. Encouraged plenty of water intake daily.     Follow Up:   Return in about 3 months (around 3/11/2024) for Follow Up.    CARMELO Palmer, FNP-C  [facility] Sleep Medicine  "

## 2024-03-11 ENCOUNTER — OFFICE VISIT (OUTPATIENT)
Dept: NEUROLOGY | Facility: CLINIC | Age: 24
End: 2024-03-11
Payer: COMMERCIAL

## 2024-03-11 VITALS
OXYGEN SATURATION: 99 % | SYSTOLIC BLOOD PRESSURE: 110 MMHG | TEMPERATURE: 96.4 F | HEART RATE: 85 BPM | WEIGHT: 293 LBS | DIASTOLIC BLOOD PRESSURE: 80 MMHG | HEIGHT: 62 IN | BODY MASS INDEX: 53.92 KG/M2

## 2024-03-11 DIAGNOSIS — R56.9 SEIZURE-LIKE ACTIVITY: Primary | ICD-10-CM

## 2024-03-11 DIAGNOSIS — G43.009 MIGRAINE WITHOUT AURA AND WITHOUT STATUS MIGRAINOSUS, NOT INTRACTABLE: ICD-10-CM

## 2024-03-11 PROCEDURE — 99214 OFFICE O/P EST MOD 30 MIN: CPT | Performed by: NURSE PRACTITIONER

## 2024-03-11 RX ORDER — ESTAZOLAM 2 MG/1
1 TABLET ORAL DAILY
COMMUNITY
Start: 2024-02-05

## 2024-03-11 RX ORDER — ERGOCALCIFEROL 1.25 MG/1
CAPSULE ORAL
COMMUNITY
Start: 2024-03-04

## 2024-03-11 RX ORDER — ATOGEPANT 60 MG/1
60 TABLET ORAL DAILY
Qty: 16 TABLET | Refills: 0 | COMMUNITY
Start: 2024-03-11

## 2024-03-11 NOTE — PROGRESS NOTES
"     Follow Up Office Visit      Patient Name: Danuta Gutierrez  : 2000   MRN: [id number]     Chief Complaint:    Chief Complaint   Patient presents with    Follow-up     Patient in office to follow up on seizures.          History of Present Illness: Danuta Gutierrez is a 24 y.o. female who is here today to follow up with seizures and was last seen on 2023.  She is unaccompanied today.  She did try to decrease her dose of Topiramate and started to feel worse so she increased the dose back up.  She is taking Topiramate 100mg BID- reports good compliance but is still having some tingling in her feet and hands.  She had no seizures and no episodes of loss of time since her previous visit.  She states, \"I thought I was doing really well but I feel I am doing less well, now\".  She is having headaches now and daytime sleepiness.  Describes her headaches as frontal, aching and stabbing, accompanied by photophobia, lasting more than 4 hours.  She has a headache on most days of the month with at least 10 being migraines.  She feels her migraines are worsened by stress and anxiety.  She has a lot of stress and anxiety- work is very stressful.  Taking OTC Ibuprofen PRN headaches- taking 2-4 at a time.    *Previously took Amitriptyline and Nortriptyline without improvement.  Beta blocker contraindicated due to bradycardia.  Previously unable to tolerate Venlafaxine.      Following taken from previous visit note:  Danuta Gutierrez is a 23 y.o. female who is here today to follow up with seizure-like activity and was last seen on 10/2/2023.  She is accompanied by her mother, Sophia, again today.  She is taking Topiramate 100mg BID- reports good compliance; is having some tingling in her feet.  She has had no seizure-like activity since her previous visit.  She has had less headaches since starting Topiramate.  Her mother feels she is doing quite well right now.   *PSG on 10/16/2023 showed no evidence of significant HUONG with an " "AHI of 1/hour.      Subjective      Review of Systems:   Review of Systems   Neurological:  Positive for seizures and headache.       I have reviewed and the following portions of the patient's history were updated as appropriate: past family history, past medical history, past social history, past surgical history and problem list.    Medications:     Current Outpatient Medications:     MICROGESTIN 1-20 MG-MCG per tablet, Take 1 tablet by mouth Daily., Disp: , Rfl:     sertraline (ZOLOFT) 100 MG tablet, Take 1 tablet by mouth Daily., Disp: , Rfl:     topiramate (Topamax) 100 MG tablet, Take 1 tablet by mouth 2 (Two) Times a Day., Disp: 60 tablet, Rfl: 5    vitamin D (ERGOCALCIFEROL) 1.25 MG (72513 UT) capsule capsule, , Disp: , Rfl:     Atogepant (Qulipta) 60 MG tablet, Take 1 tablet by mouth Daily., Disp: 16 tablet, Rfl: 0    Atogepant 60 MG tablet, Take 1 tablet by mouth Daily., Disp: 30 tablet, Rfl: 5    Allergies:   No Known Allergies    Objective     Physical Exam:  Vital Signs:   Vitals:    03/11/24 1525   BP: 110/80   BP Location: Right arm   Patient Position: Sitting   Cuff Size: Adult   Pulse: 85   Temp: 96.4 °F (35.8 °C)   SpO2: 99%   Weight: (!) 147 kg (323 lb)   Height: 157.5 cm (62\")   PainSc: 0-No pain     Body mass index is 59.08 kg/m².    Physical Exam  Vitals and nursing note reviewed.   Constitutional:       General: She is not in acute distress.     Appearance: Normal appearance. She is well-developed. She is obese. She is not diaphoretic.   HENT:      Head: Normocephalic and atraumatic.   Eyes:      Extraocular Movements: Extraocular movements intact.      Conjunctiva/sclera: Conjunctivae normal.   Pulmonary:      Effort: Pulmonary effort is normal. No respiratory distress.   Musculoskeletal:         General: Normal range of motion.   Skin:     General: Skin is warm and dry.   Neurological:      Mental Status: She is alert and oriented to person, place, and time.   Psychiatric:         Mood and " Affect: Mood normal.         Behavior: Behavior normal.         Thought Content: Thought content normal.         Judgment: Judgment normal.         Neurologic Exam     Mental Status   Oriented to person, place, and time.        Assessment / Plan      Assessment/Plan:   Diagnoses and all orders for this visit:    1. Seizure-like activity (Primary)    2. Migraine without aura and without status migrainosus, not intractable  -     Atogepant (Qulipta) 60 MG tablet; Take 1 tablet by mouth Daily.  Dispense: 16 tablet; Refill: 0  -     Atogepant 60 MG tablet; Take 1 tablet by mouth Daily.  Dispense: 30 tablet; Refill: 5    3. BMI 50.0-59.9, adult    *Indications and possible SEs of Atogepant discussed with patient. Patient education on migraines provided today. Will continue Topiramate for now.     Follow Up:   Return in about 10 weeks (around 5/20/2024) for Follow Up.    CARMELO Palmer, FNP-C  [facility] Neurology and Sleep Medicine

## 2024-03-12 ENCOUNTER — TELEPHONE (OUTPATIENT)
Dept: NEUROLOGY | Facility: CLINIC | Age: 24
End: 2024-03-12
Payer: COMMERCIAL

## 2024-03-12 NOTE — TELEPHONE ENCOUNTER
Caller: Danuta Gutierrez    Relationship: Self    Best call back number: [phone]    What form or medical record are you requesting:   WORK    Who is requesting this form or medical record from you:   [facility] COURT OFFICE    How would you like to receive the form or medical records (pick-up, mail, fax): FAX  If fax, what is the fax number: [phone]  If mail, what is the address:   If pick-up, provide patient with address and location details    Timeframe paperwork needed:   ASAP    Additional notes:   PT SEEN [facility] FOR OV ON 3-11-24 AND NEEDS A WORK EXCUSE. PLEASE FAX TO THE NUMBER LISTED ABOVE.

## 2024-04-20 DIAGNOSIS — R56.9 SEIZURE-LIKE ACTIVITY: ICD-10-CM

## 2024-04-22 RX ORDER — TOPIRAMATE 100 MG/1
100 TABLET, FILM COATED ORAL 2 TIMES DAILY
Qty: 180 TABLET | Refills: 0 | Status: SHIPPED | OUTPATIENT
Start: 2024-04-22

## 2024-05-22 ENCOUNTER — OFFICE VISIT (OUTPATIENT)
Dept: NEUROLOGY | Facility: CLINIC | Age: 24
End: 2024-05-22
Payer: COMMERCIAL

## 2024-05-22 VITALS
HEIGHT: 62 IN | OXYGEN SATURATION: 98 % | DIASTOLIC BLOOD PRESSURE: 82 MMHG | HEART RATE: 72 BPM | WEIGHT: 293 LBS | SYSTOLIC BLOOD PRESSURE: 112 MMHG | TEMPERATURE: 97.1 F | BODY MASS INDEX: 53.92 KG/M2

## 2024-05-22 DIAGNOSIS — G43.009 MIGRAINE WITHOUT AURA AND WITHOUT STATUS MIGRAINOSUS, NOT INTRACTABLE: Primary | ICD-10-CM

## 2024-05-22 DIAGNOSIS — R56.9 SEIZURE-LIKE ACTIVITY: ICD-10-CM

## 2024-05-22 PROCEDURE — 99213 OFFICE O/P EST LOW 20 MIN: CPT | Performed by: NURSE PRACTITIONER

## 2024-05-22 RX ORDER — CYPROHEPTADINE HYDROCHLORIDE 4 MG/1
4 TABLET ORAL
Qty: 30 TABLET | Refills: 5 | Status: SHIPPED | OUTPATIENT
Start: 2024-05-22

## 2024-05-22 NOTE — PROGRESS NOTES
"     Follow Up Office Visit      Patient Name: Danuta Gutierrez  : 2000   MRN: [id number]     Chief Complaint:    Chief Complaint   Patient presents with    Follow-up     Patient in office to follow up on seizures.          History of Present Illness: Danuta Gutierrez is a 24 y.o. female who is here today to follow up with seizures and was last seen on 3/11/2024.  She is taking Topiramate 100mg BID and Atogepant 60mg daily- reports good compliance.  She is still having quite a bit of tingling in her hands and feet and has had some problems with communication since taking Topiramate.  She feels the Atogepant has helped her migraines.  Her migraine and headache days have been decreased.  She has been taking Ibuprofen much less frequently.  She has had no seizures, seizure-like activity, or episodes of loss of time.  She is not sexually active.     Following taken from previous visit note:  Danuta Gutierrez is a 24 y.o. female who is here today to follow up with seizures and was last seen on 2023.  She is unaccompanied today.  She did try to decrease her dose of Topiramate and started to feel worse so she increased the dose back up.  She is taking Topiramate 100mg BID- reports good compliance but is still having some tingling in her feet and hands.  She had no seizures and no episodes of loss of time since her previous visit.  She states, \"I thought I was doing really well but I feel I am doing less well, now\".  She is having headaches now and daytime sleepiness.  Describes her headaches as frontal, aching and stabbing, accompanied by photophobia, lasting more than 4 hours.  She has a headache on most days of the month with at least 10 being migraines.  She feels her migraines are worsened by stress and anxiety.  She has a lot of stress and anxiety- work is very stressful.  Taking OTC Ibuprofen PRN headaches- taking 2-4 at a time.    *Previously took Amitriptyline and Nortriptyline without improvement.  Beta blocker " "contraindicated due to bradycardia.  Previously unable to tolerate Venlafaxine.      Subjective      Review of Systems:   Review of Systems   Neurological:  Positive for headache.       I have reviewed and the following portions of the patient's history were updated as appropriate: past family history, past medical history, past social history, past surgical history and problem list.    Medications:     Current Outpatient Medications:     Atogepant 60 MG tablet, Take 1 tablet by mouth Daily., Disp: 30 tablet, Rfl: 5    MICROGESTIN 1-20 MG-MCG per tablet, Take 1 tablet by mouth Daily., Disp: , Rfl:     sertraline (ZOLOFT) 100 MG tablet, Take 1 tablet by mouth Daily., Disp: , Rfl:     topiramate (TOPAMAX) 100 MG tablet, Take 1 tablet by mouth twice daily, Disp: 180 tablet, Rfl: 0    vitamin D (ERGOCALCIFEROL) 1.25 MG (58561 UT) capsule capsule, , Disp: , Rfl:     cyproheptadine (PERIACTIN) 4 MG tablet, Take 1 tablet by mouth every night at bedtime., Disp: 30 tablet, Rfl: 5    Allergies:   No Known Allergies    Objective     Physical Exam:  Vital Signs:   Vitals:    05/22/24 0814   BP: 112/82   BP Location: Right arm   Patient Position: Sitting   Cuff Size: Adult   Pulse: 72   Temp: 97.1 °F (36.2 °C)   SpO2: 98%   Weight: (!) 138 kg (304 lb)   Height: 157.5 cm (62\")   PainSc: 0-No pain     Body mass index is 55.6 kg/m².    Physical Exam  Vitals and nursing note reviewed.   Constitutional:       General: She is not in acute distress.     Appearance: Normal appearance. She is well-developed. She is obese. She is not diaphoretic.   HENT:      Head: Normocephalic and atraumatic.   Eyes:      Extraocular Movements: Extraocular movements intact.      Conjunctiva/sclera: Conjunctivae normal.   Pulmonary:      Effort: Pulmonary effort is normal. No respiratory distress.   Musculoskeletal:         General: Normal range of motion.   Skin:     General: Skin is dry.   Neurological:      Mental Status: She is alert and oriented to " person, place, and time.   Psychiatric:         Mood and Affect: Mood normal.         Behavior: Behavior normal.         Thought Content: Thought content normal.         Judgment: Judgment normal.         Neurologic Exam     Mental Status   Oriented to person, place, and time.        Assessment / Plan      Assessment/Plan:   Diagnoses and all orders for this visit:    1. Migraine without aura and without status migrainosus, not intractable (Primary)  -     cyproheptadine (PERIACTIN) 4 MG tablet; Take 1 tablet by mouth every night at bedtime.  Dispense: 30 tablet; Refill: 5    2. Seizure-like activity    3. BMI 50.0-59.9, adult    *Encouraged patient to taper off of Topiramate.   *Indications and possible SEs of Periactin discussed with patient.    *Continue Atogepant.     Follow Up:   Return in about 4 months (around 9/22/2024) for Follow Up.    CARMELO Palmer, FNP-C  [facility]

## 2024-07-03 ENCOUNTER — OFFICE VISIT (OUTPATIENT)
Dept: NEUROLOGY | Facility: CLINIC | Age: 24
End: 2024-07-03
Payer: COMMERCIAL

## 2024-07-03 VITALS
WEIGHT: 293 LBS | SYSTOLIC BLOOD PRESSURE: 110 MMHG | HEIGHT: 62 IN | OXYGEN SATURATION: 99 % | HEART RATE: 72 BPM | TEMPERATURE: 97.1 F | BODY MASS INDEX: 53.92 KG/M2 | DIASTOLIC BLOOD PRESSURE: 82 MMHG

## 2024-07-03 DIAGNOSIS — R56.9 SEIZURE-LIKE ACTIVITY: ICD-10-CM

## 2024-07-03 DIAGNOSIS — F41.9 ANXIETY DISORDER, UNSPECIFIED TYPE: ICD-10-CM

## 2024-07-03 DIAGNOSIS — G43.009 MIGRAINE WITHOUT AURA AND WITHOUT STATUS MIGRAINOSUS, NOT INTRACTABLE: Primary | ICD-10-CM

## 2024-07-03 PROCEDURE — 99214 OFFICE O/P EST MOD 30 MIN: CPT | Performed by: NURSE PRACTITIONER

## 2024-07-03 RX ORDER — VENLAFAXINE 37.5 MG/1
37.5 TABLET ORAL 2 TIMES DAILY
Qty: 60 TABLET | Refills: 2 | Status: SHIPPED | OUTPATIENT
Start: 2024-07-03

## 2024-07-03 NOTE — PROGRESS NOTES
"     Follow Up Office Visit      Patient Name: Danuta Gutierrez  : 2000   MRN: [id number]     Chief Complaint:    Chief Complaint   Patient presents with    Follow-up     Patient in office to follow up on migraines.        History of Present Illness: Danuta Gutierrez is a 24 y.o. female who is here today to follow up with migraines and was last seen on 2024.  She is taking Atogepant 60mg daily and reports good compliance and toleration.  She had tried Periactin but it made her too sleepy the next day so she is no longer taking that.  She tapered off the Topiramate- tingling has improved since stopping this.  Feels her migraines are well controlled right now.  Stress is a migraine trigger.  Denies having any seizure like episodes since her previous visit.  She complains of having leg cramping and tingling in her face, mostly at night/during the night when awakening.  She is concerned she could have another seizure like episode because these are the symptoms she had with her other seizure like episodes.  She says she took Lamictal when she was a teenager and thinks she tolerated it well but she can't really remember.  She does have anxiety frequently and feels she has always been an anxious person; states, \"That's why I've had so many visits to mental health\".    *MRI brain with and without contrast, on 2023, was noncontributory; EEG was normal on 2023; PSG on 10/16/2023 was negative for significant HUONG with an AHI of 1/hour.     Following taken from previous visit note:  Danuta Gutierrez is a 24 y.o. female who is here today to follow up with seizures and was last seen on 3/11/2024.  She is taking Topiramate 100mg BID and Atogepant 60mg daily- reports good compliance.  She is still having quite a bit of tingling in her hands and feet and has had some problems with communication since taking Topiramate.  She feels the Atogepant has helped her migraines.  Her migraine and headache days have been decreased.  " "She has been taking Ibuprofen much less frequently.  She has had no seizures, seizure-like activity, or episodes of loss of time.  She is not sexually active.     Subjective      Review of Systems:   Review of Systems   Neurological:  Positive for numbness and headache.   Psychiatric/Behavioral:  Positive for sleep disturbance. The patient is nervous/anxious.        I have reviewed and the following portions of the patient's history were updated as appropriate: past family history, past medical history, past social history, past surgical history and problem list.    Medications:     Current Outpatient Medications:     Atogepant 60 MG tablet, Take 1 tablet by mouth Daily., Disp: 30 tablet, Rfl: 5    Cyanocobalamin 1000 MCG sublingual tablet, Place 1 tablet every day by sublingual route., Disp: , Rfl:     MICROGESTIN 1-20 MG-MCG per tablet, Take 1 tablet by mouth Daily., Disp: , Rfl:     sertraline (ZOLOFT) 100 MG tablet, Take 1 tablet by mouth Daily., Disp: , Rfl:     vitamin D (ERGOCALCIFEROL) 1.25 MG (64466 UT) capsule capsule, , Disp: , Rfl:     venlafaxine (EFFEXOR) 37.5 MG tablet, Take 1 tablet by mouth 2 (Two) Times a Day., Disp: 60 tablet, Rfl: 2    Allergies:   No Known Allergies    Objective     Physical Exam:  Vital Signs:   Vitals:    07/03/24 0824   BP: 110/82   BP Location: Right arm   Patient Position: Sitting   Cuff Size: Adult   Pulse: 72   Temp: 97.1 °F (36.2 °C)   SpO2: 99%   Weight: (!) 142 kg (312 lb)   Height: 157.5 cm (62\")   PainSc: 0-No pain     Body mass index is 57.07 kg/m².    Physical Exam  Vitals and nursing note reviewed.   Constitutional:       General: She is not in acute distress.     Appearance: Normal appearance. She is well-developed. She is obese. She is not diaphoretic.   HENT:      Head: Normocephalic and atraumatic.   Eyes:      Extraocular Movements: Extraocular movements intact.      Conjunctiva/sclera: Conjunctivae normal.   Pulmonary:      Effort: Pulmonary effort is " normal. No respiratory distress.   Musculoskeletal:         General: Normal range of motion.   Skin:     General: Skin is warm and dry.   Neurological:      Mental Status: She is alert and oriented to person, place, and time.   Psychiatric:         Attention and Perception: Attention normal.         Mood and Affect: Mood normal. Mood is not anxious. Affect is flat.         Behavior: Behavior normal.         Thought Content: Thought content normal.         Cognition and Memory: Cognition normal.         Judgment: Judgment normal.         Neurologic Exam     Mental Status   Oriented to person, place, and time.        Assessment / Plan      Assessment/Plan:   Diagnoses and all orders for this visit:    1. Migraine without aura and without status migrainosus, not intractable (Primary)  -     venlafaxine (EFFEXOR) 37.5 MG tablet; Take 1 tablet by mouth 2 (Two) Times a Day.  Dispense: 60 tablet; Refill: 2    2. Seizure-like activity    3. Anxiety disorder, unspecified type  -     venlafaxine (EFFEXOR) 37.5 MG tablet; Take 1 tablet by mouth 2 (Two) Times a Day.  Dispense: 60 tablet; Refill: 2    4. BMI 50.0-59.9, adult    *Indications and possible SEs of Venlafaxine discussed with patient. If no improvement in symptoms, may consider small dose of Clonazepam before bed time.   *Patient education on Anxiety provided today.    *Will enter referral to Dr. Barlow to rule out seizure disorder at follow up visit.   *Seizure precautions discussed and encouraged. She is aware of [place] laws regarding seizures and driving- no driving for 90 days following a seizure.     Follow Up:   Return in about 8 weeks (around 8/28/2024) for Follow Up.    CARMELO Palmer, FNP-C  [facility] Neurology and Sleep Medicine

## 2024-08-06 ENCOUNTER — PATIENT MESSAGE (OUTPATIENT)
Dept: NEUROLOGY | Facility: CLINIC | Age: 24
End: 2024-08-06
Payer: COMMERCIAL

## 2024-08-06 NOTE — TELEPHONE ENCOUNTER
From: Danuta Gutierrez  To: Minnie Lopez  Sent: 8/6/2024 12:02 PM EDT  Subject: Effexor    I seemed to be doing okay after the initial week on this medicine. When I went to up the dose to the twice a day, I began to experience periods of constipation and then diarrhea. Now beginning Friday I’ve been sick with stomach issues for days in a row. I need to know what to do because the frequent restroom trips have been taking a lot out of me and making it hard to work as well the pain that has accompanied it.

## 2024-09-09 DIAGNOSIS — G43.009 MIGRAINE WITHOUT AURA AND WITHOUT STATUS MIGRAINOSUS, NOT INTRACTABLE: ICD-10-CM

## 2024-09-09 RX ORDER — ATOGEPANT 60 MG/1
60 TABLET ORAL DAILY
Qty: 90 TABLET | Refills: 0 | Status: SHIPPED | OUTPATIENT
Start: 2024-09-09

## 2024-09-18 ENCOUNTER — OFFICE VISIT (OUTPATIENT)
Dept: NEUROLOGY | Facility: CLINIC | Age: 24
End: 2024-09-18
Payer: COMMERCIAL

## 2024-09-18 ENCOUNTER — LAB (OUTPATIENT)
Dept: LAB | Facility: HOSPITAL | Age: 24
End: 2024-09-18
Payer: COMMERCIAL

## 2024-09-18 VITALS
DIASTOLIC BLOOD PRESSURE: 80 MMHG | HEART RATE: 71 BPM | WEIGHT: 213 LBS | HEIGHT: 62 IN | OXYGEN SATURATION: 100 % | SYSTOLIC BLOOD PRESSURE: 102 MMHG | BODY MASS INDEX: 39.2 KG/M2 | TEMPERATURE: 97.1 F

## 2024-09-18 DIAGNOSIS — R56.9 SEIZURE-LIKE ACTIVITY: ICD-10-CM

## 2024-09-18 DIAGNOSIS — R20.0 NUMBNESS AND TINGLING OF RIGHT FACE: ICD-10-CM

## 2024-09-18 DIAGNOSIS — G43.009 MIGRAINE WITHOUT AURA AND WITHOUT STATUS MIGRAINOSUS, NOT INTRACTABLE: Primary | ICD-10-CM

## 2024-09-18 DIAGNOSIS — R20.2 NUMBNESS AND TINGLING OF RIGHT FACE: ICD-10-CM

## 2024-09-18 LAB
FOLATE SERPL-MCNC: 7.85 NG/ML (ref 4.78–24.2)
VIT B12 BLD-MCNC: 336 PG/ML (ref 211–946)

## 2024-09-18 PROCEDURE — 86038 ANTINUCLEAR ANTIBODIES: CPT

## 2024-09-18 PROCEDURE — 83921 ORGANIC ACID SINGLE QUANT: CPT

## 2024-09-18 PROCEDURE — 99214 OFFICE O/P EST MOD 30 MIN: CPT | Performed by: NURSE PRACTITIONER

## 2024-09-18 PROCEDURE — 82746 ASSAY OF FOLIC ACID SERUM: CPT

## 2024-09-18 PROCEDURE — 82607 VITAMIN B-12: CPT

## 2024-09-18 PROCEDURE — 36415 COLL VENOUS BLD VENIPUNCTURE: CPT

## 2024-09-20 LAB
ANA SER QL IF: NEGATIVE
LABORATORY COMMENT REPORT: NORMAL

## 2024-09-23 LAB — METHYLMALONATE SERPL-SCNC: 185 NMOL/L (ref 0–378)

## 2024-12-13 DIAGNOSIS — G43.009 MIGRAINE WITHOUT AURA AND WITHOUT STATUS MIGRAINOSUS, NOT INTRACTABLE: ICD-10-CM

## 2024-12-13 RX ORDER — ATOGEPANT 60 MG/1
60 TABLET ORAL DAILY
Qty: 90 TABLET | Refills: 0 | Status: SHIPPED | OUTPATIENT
Start: 2024-12-13

## 2025-03-13 DIAGNOSIS — G43.009 MIGRAINE WITHOUT AURA AND WITHOUT STATUS MIGRAINOSUS, NOT INTRACTABLE: ICD-10-CM

## 2025-03-13 RX ORDER — ATOGEPANT 60 MG/1
60 TABLET ORAL DAILY
Qty: 30 TABLET | Refills: 0 | Status: SHIPPED | OUTPATIENT
Start: 2025-03-13

## 2025-03-24 ENCOUNTER — OFFICE VISIT (OUTPATIENT)
Dept: NEUROLOGY | Facility: CLINIC | Age: 25
End: 2025-03-24
Payer: COMMERCIAL

## 2025-03-24 VITALS
WEIGHT: 293 LBS | HEART RATE: 86 BPM | HEIGHT: 62 IN | DIASTOLIC BLOOD PRESSURE: 92 MMHG | BODY MASS INDEX: 53.92 KG/M2 | TEMPERATURE: 97.1 F | OXYGEN SATURATION: 98 % | SYSTOLIC BLOOD PRESSURE: 155 MMHG

## 2025-03-24 DIAGNOSIS — G43.009 MIGRAINE WITHOUT AURA AND WITHOUT STATUS MIGRAINOSUS, NOT INTRACTABLE: Primary | ICD-10-CM

## 2025-03-24 DIAGNOSIS — R20.0 NUMBNESS AND TINGLING OF RIGHT FACE: ICD-10-CM

## 2025-03-24 DIAGNOSIS — R20.2 NUMBNESS AND TINGLING OF RIGHT FACE: ICD-10-CM

## 2025-03-24 PROCEDURE — 99214 OFFICE O/P EST MOD 30 MIN: CPT | Performed by: NURSE PRACTITIONER

## 2025-03-24 RX ORDER — ATOGEPANT 60 MG/1
60 TABLET ORAL DAILY
Qty: 30 TABLET | Refills: 5 | Status: SHIPPED | OUTPATIENT
Start: 2025-03-24

## 2025-03-24 RX ORDER — AMITRIPTYLINE HYDROCHLORIDE 50 MG/1
50 TABLET ORAL
Qty: 30 TABLET | Refills: 2 | Status: SHIPPED | OUTPATIENT
Start: 2025-03-24

## 2025-03-24 RX ORDER — NORETHINDRONE ACETATE/ETHINYL ESTRADIOL AND FERROUS FUMARATE 1MG-20(21)
KIT ORAL
COMMUNITY
Start: 2025-01-14

## 2025-03-24 NOTE — PROGRESS NOTES
"     Follow Up Office Visit      Patient Name: Danuta Gutierrez  : 2000   MRN: [id number]     Chief Complaint:    Chief Complaint   Patient presents with    Follow-up     Patient in office to follow up on migraines.     Seizures       History of Present Illness: Danuta Gutierrez is a 25 y.o. female who is here today to follow up with migraines and seizures and was last seen on 2024.  She did have a visit with Dr. Barlow, epileptologist, and he he felt her facial numbness was not attributed to epilepsy.  She continues to have some facial numbness, seems more on the right side.  Says she has started amitriptyline 25mg QHS and it has helped with the facial numbness- reports good compliance and toleration.  She is taking Atogepant 60mg daily and feels it does help with her migraines.  She denies significant constipation.  She feels her migraines are fairly well controlled right now.  Migraines are worse with increased stress- she has had increased stress over the past few months.    *On 2024 vitamin B12, folate, MMA and ZAHRA were all normal.   *Previous medications tried for migraine- Pericatin caused drowsiness the next day, Topiramate unable to tolerate side effects, currently taking Amitriptyline, Venlafaxine unable to tolerate side effects, Propranolol unable to tolerate side effects.     Following taken from previous visit note:   Danuta Gutierrez is a 24 y.o. female who is here today to follow up with migraines and was last seen on 7/3/2024.  She states, \"I still have numbness and and twitching in my face\".  She is still concerned the numbness and tingling episodes could be seizures- since she had these symptoms previously with seizure-like episodes.  Episodes happen often and anytime she goes to sleep and wakes up during the night.  Episodes last from 5-20 minutes.  She has had no loss of consciousness recently.  Denies any urine incontinence or tongue bites.  Does have some confusion with the episodes.  " "She has been having significant constipation since starting Venlafaxine- it has made her feel she has a little more energy.  She is taking Atogepant 60mg daily- denies constipation when she was taking this without the Venlafaxine.  She feels her migraines have been better, since her previous visit.  She is taking OTC Ibuprofen PRN migraine.  She feels her anxiety is \"decently\" controlled right now.   *MRI brain and EEG were noncontributory in August 2023.     Subjective      Review of Systems:   Review of Systems   Neurological:  Positive for numbness and headache. Negative for seizures.       I have reviewed and the following portions of the patient's history were updated as appropriate: past family history, past medical history, past social history, past surgical history and problem list.    Medications:     Current Outpatient Medications:     amitriptyline (ELAVIL) 50 MG tablet, Take 1 tablet by mouth every night at bedtime., Disp: 30 tablet, Rfl: 2    Atogepant (Qulipta) 60 MG tablet, Take 1 tablet by mouth Daily., Disp: 30 tablet, Rfl: 5    Cyanocobalamin 1000 MCG sublingual tablet, Place 1 tablet every day by sublingual route., Disp: , Rfl:     Ferrous Sulfate 325 (65 Fe) MG/5ML solution, Take 325 mg by mouth Daily., Disp: , Rfl:     Larry Fe 1/20 1-20 MG-MCG per tablet, , Disp: , Rfl:     MICROGESTIN 1-20 MG-MCG per tablet, Take 1 tablet by mouth Daily., Disp: , Rfl:     sertraline (ZOLOFT) 100 MG tablet, Take 1 tablet by mouth Daily., Disp: , Rfl:     vitamin D (ERGOCALCIFEROL) 1.25 MG (52872 UT) capsule capsule, , Disp: , Rfl:     Allergies:   No Known Allergies    Objective     Physical Exam:  Vital Signs:   Vitals:    03/24/25 0827   BP: 155/92   BP Location: Left arm   Patient Position: Sitting   Cuff Size: Adult   Pulse: 86   Temp: 97.1 °F (36.2 °C)   TempSrc: Esophageal   SpO2: 98%   Weight: (!) 146 kg (322 lb)   Height: 157.5 cm (62\")   PainSc: 0-No pain     Body mass index is 58.89 kg/m².    Physical " Exam  Vitals and nursing note reviewed.   Constitutional:       General: She is not in acute distress.     Appearance: Normal appearance. She is well-developed. She is obese. She is not diaphoretic.   HENT:      Head: Normocephalic and atraumatic.   Eyes:      Extraocular Movements: Extraocular movements intact.      Conjunctiva/sclera: Conjunctivae normal.   Pulmonary:      Effort: Pulmonary effort is normal. No respiratory distress.   Musculoskeletal:         General: Normal range of motion.   Skin:     General: Skin is warm and dry.   Neurological:      Mental Status: She is alert and oriented to person, place, and time.   Psychiatric:         Mood and Affect: Mood normal.         Behavior: Behavior normal.         Thought Content: Thought content normal.         Judgment: Judgment normal.         Neurological Exam  Mental Status  Alert. Oriented to person, place, and time.    Cranial Nerves  CN III, IV, VI: Extraocular movements intact bilaterally.        Assessment / Plan      Assessment/Plan:   Diagnoses and all orders for this visit:    1. Migraine without aura and without status migrainosus, not intractable (Primary)  -     amitriptyline (ELAVIL) 50 MG tablet; Take 1 tablet by mouth every night at bedtime.  Dispense: 30 tablet; Refill: 2  -     Atogepant (Qulipta) 60 MG tablet; Take 1 tablet by mouth Daily.  Dispense: 30 tablet; Refill: 5    2. Numbness and tingling of right face  -     amitriptyline (ELAVIL) 50 MG tablet; Take 1 tablet by mouth every night at bedtime.  Dispense: 30 tablet; Refill: 2    *If she tolerates the higher dose of Amitriptyline, will send in additional refills until follow up.      Follow Up:   Return in about 6 months (around 9/24/2025).    CARMELO Palmer, FNP-C  [facility] Neurology and Sleep Medicine

## 2025-04-23 ENCOUNTER — PATIENT MESSAGE (OUTPATIENT)
Dept: NEUROLOGY | Facility: CLINIC | Age: 25
End: 2025-04-23
Payer: COMMERCIAL

## 2025-04-24 DIAGNOSIS — G43.009 MIGRAINE WITHOUT AURA AND WITHOUT STATUS MIGRAINOSUS, NOT INTRACTABLE: Primary | ICD-10-CM

## 2025-08-15 DIAGNOSIS — R20.2 NUMBNESS AND TINGLING OF RIGHT FACE: ICD-10-CM

## 2025-08-15 DIAGNOSIS — R20.0 NUMBNESS AND TINGLING OF RIGHT FACE: ICD-10-CM

## 2025-08-15 DIAGNOSIS — G43.009 MIGRAINE WITHOUT AURA AND WITHOUT STATUS MIGRAINOSUS, NOT INTRACTABLE: ICD-10-CM

## 2025-08-19 RX ORDER — AMITRIPTYLINE HYDROCHLORIDE 50 MG/1
50 TABLET ORAL
Qty: 30 TABLET | Refills: 0 | Status: SHIPPED | OUTPATIENT
Start: 2025-08-19